# Patient Record
Sex: MALE
[De-identification: names, ages, dates, MRNs, and addresses within clinical notes are randomized per-mention and may not be internally consistent; named-entity substitution may affect disease eponyms.]

---
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
## 2024-02-26 ENCOUNTER — APPOINTMENT (OUTPATIENT)
Dept: URBAN - METROPOLITAN AREA CLINIC 205 | Age: 19
Setting detail: DERMATOLOGY
End: 2024-02-29

## 2024-02-26 DIAGNOSIS — L259 CONTACT DERMATITIS AND OTHER ECZEMA, UNSPECIFIED CAUSE: ICD-10-CM

## 2024-02-26 PROBLEM — L23.9 ALLERGIC CONTACT DERMATITIS, UNSPECIFIED CAUSE: Status: ACTIVE | Noted: 2024-02-26

## 2024-02-26 PROCEDURE — OTHER TREATMENT REGIMEN: OTHER

## 2024-02-26 PROCEDURE — OTHER PRESCRIPTION: OTHER

## 2024-02-26 PROCEDURE — OTHER COUNSELING: OTHER

## 2024-02-26 PROCEDURE — OTHER ORDER TESTS: OTHER

## 2024-02-26 PROCEDURE — 99213 OFFICE O/P EST LOW 20 MIN: CPT

## 2024-02-26 RX ORDER — TRIAMCINOLONE ACETONIDE 1 MG/G
CREAM TOPICAL
Qty: 80 | Refills: 0 | Status: ERX | COMMUNITY
Start: 2024-02-26

## 2024-02-26 RX ORDER — MUPIROCIN 20 MG/G
OINTMENT TOPICAL
Qty: 110 | Refills: 0 | Status: ERX | COMMUNITY
Start: 2024-02-26

## 2024-02-26 ASSESSMENT — LOCATION ZONE DERM: LOCATION ZONE: ARM

## 2024-02-26 ASSESSMENT — LOCATION DETAILED DESCRIPTION DERM: LOCATION DETAILED: LEFT PROXIMAL DORSAL FOREARM

## 2024-02-26 ASSESSMENT — LOCATION SIMPLE DESCRIPTION DERM: LOCATION SIMPLE: LEFT FOREARM

## 2024-02-26 NOTE — PROCEDURE: TREATMENT REGIMEN
Initiate Treatment: Triamcinolone acetonide 0.1 % topical cream Apply to skin twice a day x 2 weeks\\nmupirocin 2 % topical ointment \\nQuantity: 110.0 g  Days Supply: 30\\nSig: Apply to affected area twice a day x 2 weeks to the abdomen, arms x 2, and legs x 2\\nVanicream products.  Instructions provided in writing.
Detail Level: Detailed
Plan: Ace and his Mom are present at this visit.  His Mom did most of the talking for him.  I explained my concern of allergic contact dermatitis with a secondary bacterial infection.  She says he is allergic to all oral antibiotics.  This is unusual.  She says he is definitely allergic to penicillin and sulfa.  She doesn't know of any other specific allergy to any other antibiotics.  She knows he can take a z-pack (azithromycin).  I told her it is unlikely that this infection would respond to a Z-pack.  I recommended doxycycline.  I told her this is a tetracycline. She has reported no known allergy to tetracycline however she is very concerned about using anything other than a Z-pac because of her allergy concern.  It is clear she finds the idea of any other oral antibiotic very upsetting.  I told her we will use mupirocin only for now.  If the bacterial culture reveals abnormal bacteria we will need consider an oral antibiotic that is effective against the bacteria.  We will call his pcp and pharmacy prior to prescribing any oral antibiotic to see if there is any documentation of allergy to anything other than sulfa and penicillin.  Mom agreed to this plan.  Ace agreed too.\\n\\n2/29/2024 - bacterial culture is negative.  Continue current treatment plan.

## 2024-02-26 NOTE — HPI: RASH
Is This A New Presentation, Or A Follow-Up?: Rash
Additional History: Pt complains of a rash that he has had for about two weeks now. It started on his L forearm then spread to his R low back and to his inner thighs. He has not seen anyone else for this or used any topical RX. He states he did use calamine last night and it helped it not itch so bad.

## 2024-06-06 NOTE — PROCEDURE: ORDER TESTS
Keep a list of your medicines with you. List all of the prescription medicines, nonprescription medicines, supplements, natural remedies, and vitamins that you take. Tell your healthcare providers who treat you about all of the products you are taking. Your provider can provide you with a form to keep track of them. Just ask.  Follow the directions that come with your medicine, including information about food or alcohol. Make sure you know how and when to take your medicine. Do not take more or less than you are supposed to take.  Keep all medicines out of the reach of children.  Store medicines according to the directions on the label.  Monitor yourself. Learn to know how your body reacts to your new medicine and keep track of how it makes you feel before attempting (If your provider has allowed you to do so) to drive or go to work.   Seek emergency medical attention if you think you have used too much of this medicine. An overdose of any prescription medicine can be fatal. Overdose symptoms may include extreme drowsiness, muscle weakness, confusion, cold and clammy skin, pinpoint pupils, shallow breathing, slow heart rate, fainting, or coma.  Don't share prescription medicines with others, even when they seem to have the same symptoms. What may be good for you may be harmful to others.  If you are no longer taking a prescribed medication and you have pills left please take your pills out of their original containers. Mix crushed pills with an undesirable substance, such as cat litter or used coffee grounds. Put the mixture into a disposable container with a lid, such as an empty margarine tub, or into a sealable bag.  Cover up or remove any of your personal information on the empty containers by covering it with black permanent marker or duct tape.  Place the sealed container with the mixture, and the empty drug containers, in the trash.   If you use a medication that is in the form of a patch, dispose of used 
Lab Facility: [id number]
Expected Date Of Service: 02/26/2024
Bill For Surgical Tray: no
Performing Laboratory: [id number]
Billing Type: Third-Party Bill